# Patient Record
Sex: MALE | Race: WHITE | NOT HISPANIC OR LATINO | Employment: FULL TIME | URBAN - METROPOLITAN AREA
[De-identification: names, ages, dates, MRNs, and addresses within clinical notes are randomized per-mention and may not be internally consistent; named-entity substitution may affect disease eponyms.]

---

## 2019-04-24 ENCOUNTER — TELEPHONE (OUTPATIENT)
Dept: PULMONOLOGY | Facility: CLINIC | Age: 54
End: 2019-04-24

## 2019-04-24 NOTE — TELEPHONE ENCOUNTER
----- Message from Ivy Hernandez sent at 4/24/2019 11:02 AM CDT -----  Contact: pt  Type:  Patient Returning Call    Who Called:pt  Who Left Message for Patient nurse  Does the patient know what this is regarding?:appt  Would the patient rather a call back or a response via [facility]? Call back  Best Call Back Number:[phone]  Additional Information: na

## 2019-05-15 ENCOUNTER — OFFICE VISIT (OUTPATIENT)
Dept: SLEEP MEDICINE | Facility: CLINIC | Age: 54
End: 2019-05-15
Payer: COMMERCIAL

## 2019-05-15 VITALS
HEART RATE: 75 BPM | DIASTOLIC BLOOD PRESSURE: 84 MMHG | SYSTOLIC BLOOD PRESSURE: 126 MMHG | BODY MASS INDEX: 26.28 KG/M2 | HEIGHT: 72 IN | WEIGHT: 194 LBS | RESPIRATION RATE: 18 BRPM | OXYGEN SATURATION: 95 %

## 2019-05-15 DIAGNOSIS — G47.26 SHIFTING SLEEP-WORK SCHEDULE: ICD-10-CM

## 2019-05-15 DIAGNOSIS — I10 HYPERTENSION, UNSPECIFIED TYPE: ICD-10-CM

## 2019-05-15 DIAGNOSIS — R06.83 SNORING: ICD-10-CM

## 2019-05-15 DIAGNOSIS — G47.33 OSA (OBSTRUCTIVE SLEEP APNEA): Primary | ICD-10-CM

## 2019-05-15 PROCEDURE — 99203 OFFICE O/P NEW LOW 30 MIN: CPT | Mod: S$GLB,,, | Performed by: NURSE PRACTITIONER

## 2019-05-15 PROCEDURE — 99999 PR PBB SHADOW E&M-EST. PATIENT-LVL III: ICD-10-PCS | Mod: PBBFAC,,, | Performed by: NURSE PRACTITIONER

## 2019-05-15 PROCEDURE — 99203 PR OFFICE/OUTPT VISIT, NEW, LEVL III, 30-44 MIN: ICD-10-PCS | Mod: S$GLB,,, | Performed by: NURSE PRACTITIONER

## 2019-05-15 PROCEDURE — 99999 PR PBB SHADOW E&M-EST. PATIENT-LVL III: CPT | Mod: PBBFAC,,, | Performed by: NURSE PRACTITIONER

## 2019-05-15 NOTE — PATIENT INSTRUCTIONS
"Your provider has ordered a sleep study.   You should be receiving a phone call from the sleep lab within 2 weeks after your study has been approved by your insurance. Your insurance will decide approval for home sleep test vs. inlab sleep study (formal polysomnogram).  Please make sure you have your [facility]. If you do not hear from anyone in 2 weeks, please call the sleep lab at [phone] to schedule your sleep study.     -The formal inlab sleep studies are performed at [facility]. If it is noted that you do have sleep apnea on your initial sleep study, you may receive a call back for a second night study with the CPAP before you come back to the office.     -The home sleep test are performed at your home. You will  the home sleep study from the [facility] location. A technician will go over the home sleep study with you in detail. You can also review "[facility] home sleep study" on Youtube.     The sleep lab will also make a follow up appointment with your provider to review the results of the sleep test. This follow up appointment will be 10-14 days after your sleep study to review the results.     .lblh  What Are Snoring and Obstructive Sleep Apnea?  If youve ever had a stuffed-up nose, you know the feeling of trying to breathe through a very narrow passageway. This is what happens in your throat when you snore. While you sleep, structures in your throat partially block your air passage, making the passage narrow and hard to breathe through. If the entire passage becomes blocked and you cant breathe at all, you have sleep apnea.      Snoring Obstructive sleep apnea   Snoring  If your throat structures are too large or the muscles relax too much during sleep, the air passage may be partially blocked. As air from the nose or mouth passes around this blockage, the throat structures vibrate, causing the familiar sound of snoring. At times, this sound " can be so loud that snorers wake up others, or even themselves, during the night. Snoring gets worse as more and more of the air passage is blocked.  Obstructive sleep apnea  If the structures completely block the throat, air cant flow to the lungs at all. This is called apnea (meaning no breathing). Since the lungs arent getting fresh air, the brain tells the body to wake up just enough to tighten the muscles and unblock the air passage. With a loud gasp, breathing begins again. This process may be repeated over and over again throughout the night, making your sleep fragmented with a lighter stage of sleep. Even though you do not remember waking up many times during the night to a lighter sleep, you feel tired the next day. The lack of sleep and fresh air can also strain your lungs, heart, and other organs, leading to problems such as high blood pressure, heart attack, or stroke.  Problems in the nose and jaw  Problems in the structure of the nose may obstruct breathing. A crooked (deviated) septum or swollen turbinates can make snoring worse or lead to apnea. Also, a receding jaw may make the tongue sit too far back, so its more likely to block the airway when youre asleep.        Date Last Reviewed: 7/18/2015  © 7136-7249 [employer]. [address] [place] [address], [place] [address]. All rights reserved. This information is not intended as a substitute for professional medical care. Always follow your healthcare professional's instructions.

## 2019-05-15 NOTE — PROGRESS NOTES
Subjective:      Patient ID: Artie Davila is a 53 y.o. male.    Chief Complaint: Sleep Apnea    HPI    Patient presents to the office today for evaluation of sleep apnea.  Patient with loud snoring. Patient not having problems falling asleep, but wakes up frequently throughout the night.   [place] Sleepiness Scale score 6.  Patient has had symptoms for years. Comorbidities include HTN on 3 BP medication  Bedtime: 8:30PM  Wake time: 2:30AM    STOP - BANG Questionnaire:     1. Snoring : Do you snore loudly ?    Yes    2. Tired : Do you often feel tired, fatigued, or sleepy during daytime?   Yes    3. Observed: Has anyone observed you stop breathing during your sleep?   No    4. Blood pressure : Do you have or are you being treated for high blood pressure?   Yes    5. BMI :BMI more than 35 kg/m2?   No    6. Age : Age over 50 yr old?   Yes    7. Neck circumference: Neck circumference greater than 40 cm?   No    8. Gender: Gender male?   Yes    High risk of AKL: Yes 5 - 8  Intermediate risk of KAL: Yes 3 - 4  Low risk of KAL: Yes 0 - 2      References:   STOP Questionnaire   A Tool to Screen Patients for Obstructive Sleep Apnea: BRETT GutierrezR.C.P.C., ELYSIA Killian.B.B.S., Isabela Song M.D.,Eunice Barcenas, Ph.D., ELYSIA Montes.B.B.S.,_ ELYSIA Valenzuela.Sc.,_ Stevie Fields M.D., Elroy Yu F.R.C.P.C.; Anesthesiology 2008; 108:812-21 Copyright © 2008, the American Society of Anesthesiologists, Inc. [employer]    Patient Active Problem List   Diagnosis    Hypertensive disorder         /84   Pulse 75   Resp 18   Ht 6' (1.829 m)   Wt 88 kg (194 lb 0.1 oz)   SpO2 95%   BMI 26.31 kg/m²   Body mass index is 26.31 kg/m².    Review of Systems   Respiratory: Positive for snoring.    Psychiatric/Behavioral: Positive for sleep disturbance.   All other systems reviewed and are negative.        Objective:      Physical Exam   Constitutional: He is oriented to person,  place, and time. He appears well-developed and well-nourished.   HENT:   Head: Normocephalic and atraumatic.   Mouth/Throat: Oropharynx is clear and moist.   Mallampati Score: III   Neck: Normal range of motion. Neck supple.   Cardiovascular: Normal rate and regular rhythm.   Pulmonary/Chest: Effort normal and breath sounds normal.   Abdominal: Soft.   Musculoskeletal: He exhibits no edema.   Neurological: He is alert and oriented to person, place, and time.   Skin: Skin is warm and dry.   Psychiatric: He has a normal mood and affect.     Assessment:     1. KAL (obstructive sleep apnea)    2. Hypertension, unspecified type    3. Snoring    4. Shifting sleep-work schedule       Outpatient Encounter Medications as of 5/15/2019   Medication Sig Dispense Refill    amlodipine (NORVASC) 10 MG tablet Take 10 mg by mouth once daily.  5    hydrochlorothiazide (HYDRODIURIL) 25 MG tablet Take 25 mg by mouth every morning.  5    losartan (COZAAR) 50 MG tablet Take 50 mg by mouth once daily.  5     No facility-administered encounter medications on file as of 5/15/2019.      Orders Placed This Encounter   Procedures    Home Sleep Studies     Standing Status:   Future     Standing Expiration Date:   5/15/2020     Scheduling Instructions:      3 night protocol     Plan:     Problem List Items Addressed This Visit        Cardiac/Vascular    Hypertensive disorder      Other Visit Diagnoses     KAL (obstructive sleep apnea)    -  Primary    Relevant Orders    Home Sleep Studies    Snoring        Shifting sleep-work schedule             Home sleep test for evaluation of sleep apnea. Return to clinic when study is available for review.

## 2019-05-16 ENCOUNTER — TELEPHONE (OUTPATIENT)
Dept: PULMONOLOGY | Facility: CLINIC | Age: 54
End: 2019-05-16

## 2019-06-07 ENCOUNTER — PROCEDURE VISIT (OUTPATIENT)
Dept: SLEEP MEDICINE | Facility: CLINIC | Age: 54
End: 2019-06-07
Payer: COMMERCIAL

## 2019-06-07 DIAGNOSIS — G47.33 OSA (OBSTRUCTIVE SLEEP APNEA): Primary | ICD-10-CM

## 2019-06-07 PROCEDURE — 95806 PR SLEEP STUDY, UNATTENDED, SIMUL RECORD HR/O2 SAT/RESP FLOW/RESP EFFT: ICD-10-PCS | Mod: S$GLB,,, | Performed by: INTERNAL MEDICINE

## 2019-06-07 PROCEDURE — 99499 NO LOS: ICD-10-PCS | Mod: S$GLB,,, | Performed by: INTERNAL MEDICINE

## 2019-06-07 PROCEDURE — 99499 UNLISTED E&M SERVICE: CPT | Mod: S$GLB,,, | Performed by: INTERNAL MEDICINE

## 2019-06-07 PROCEDURE — 95806 SLEEP STUDY UNATT&RESP EFFT: CPT | Mod: S$GLB,,, | Performed by: INTERNAL MEDICINE

## 2019-06-07 NOTE — PROCEDURES
Home Sleep Studies  Date/Time: 6/7/2019 1:54 PM  Performed by: Roldan Kelly MD  Authorized by: Elizabeth Lejeune, NP       Assessment and Recommendations 2 night study MILD OBSTRUCTIVE SLEEP APNEA with overall AHI 9.9/hr ( 56 events: Night #1) Oxygen desaturation: 86%. SpO2 between 85% to 89% for   1 min. Patient snored 100% time above 50 . Heart rate range: 76 bpm -91 bpm  REC's: Initiate APAP at 6-20 cm WP using mask of choice with heated humidification. INLAB CPAP titration may also be option. Other options may be considered if intolerant to PAP: To be discussed with sleep provider. Weight loss/management. with regular exercise per direction of physician. Avoid drowsy driving. Follow up in sleep clinic to maximize adherence and ensure resolution of symptoms.

## 2019-06-07 NOTE — PROGRESS NOTES
Assessment and Recommendations 2 night study MILD OBSTRUCTIVE SLEEP APNEA with overall AHI 9.9/hr ( 56 events: Night #1) Oxygen desaturation: 86%. SpO2 between 85% to 89% for   1 min. Patient snored 100% time above 50 . Heart rate range: 76 bpm -91 bpm  REC's: Initiate APAP at 6-20 cm WP using mask of choice with heated humidification. INLAB CPAP titration may also be option. Other options may be considered if intolerant to PAP: To be discussed with sleep provider. Weight loss/management. with regular exercise per direction of physician. Avoid drowsy driving. Follow up in sleep clinic to maximize adherence and ensure resolution of symptoms.

## 2019-06-07 NOTE — Clinical Note
Assessment and Recommendations 2 night study MILD OBSTRUCTIVE SLEEP APNEA with overall AHI 9.9/hr ( 56 events: Night #1) Oxygen desaturation: 86%. SpO2 between 85% to 89% for   1 min. Patient snored 100% time above 50 . Heart rate range: 76 bpm -91 bpmREC's: Initiate APAP at 6-20 cm WP using mask of choice with heated humidification. INLAB CPAP titration may also be option. Other options may be considered if intolerant to PAP: To be discussed with sleep provider. Weight loss/management. with regular exercise per direction of physician. Avoid drowsy driving. Follow up in sleep clinic to maximize adherence and ensure resolution of symptoms.

## 2019-06-10 ENCOUNTER — TELEPHONE (OUTPATIENT)
Dept: PULMONOLOGY | Facility: CLINIC | Age: 54
End: 2019-06-10

## 2019-06-10 DIAGNOSIS — G47.33 OSA (OBSTRUCTIVE SLEEP APNEA): Primary | ICD-10-CM

## 2019-06-11 ENCOUNTER — TELEPHONE (OUTPATIENT)
Dept: PULMONOLOGY | Facility: CLINIC | Age: 54
End: 2019-06-11

## 2019-08-21 ENCOUNTER — OFFICE VISIT (OUTPATIENT)
Dept: SLEEP MEDICINE | Facility: CLINIC | Age: 54
End: 2019-08-21
Payer: COMMERCIAL

## 2019-08-21 VITALS
WEIGHT: 195.56 LBS | SYSTOLIC BLOOD PRESSURE: 124 MMHG | OXYGEN SATURATION: 98 % | DIASTOLIC BLOOD PRESSURE: 78 MMHG | BODY MASS INDEX: 26.52 KG/M2 | HEART RATE: 81 BPM | RESPIRATION RATE: 14 BRPM

## 2019-08-21 DIAGNOSIS — G47.33 OSA (OBSTRUCTIVE SLEEP APNEA): Primary | ICD-10-CM

## 2019-08-21 DIAGNOSIS — I10 HYPERTENSION, UNSPECIFIED TYPE: ICD-10-CM

## 2019-08-21 PROCEDURE — 99213 OFFICE O/P EST LOW 20 MIN: CPT | Mod: S$GLB,,, | Performed by: NURSE PRACTITIONER

## 2019-08-21 PROCEDURE — 99999 PR PBB SHADOW E&M-EST. PATIENT-LVL III: CPT | Mod: PBBFAC,,, | Performed by: NURSE PRACTITIONER

## 2019-08-21 PROCEDURE — 99213 PR OFFICE/OUTPT VISIT, EST, LEVL III, 20-29 MIN: ICD-10-PCS | Mod: S$GLB,,, | Performed by: NURSE PRACTITIONER

## 2019-08-21 PROCEDURE — 99999 PR PBB SHADOW E&M-EST. PATIENT-LVL III: ICD-10-PCS | Mod: PBBFAC,,, | Performed by: NURSE PRACTITIONER

## 2019-08-21 NOTE — PROGRESS NOTES
Subjective:      Patient ID: Artie Davila is a 53 y.o. male.    Chief Complaint: Follow-up (2 month )    HPI  Presents to office for review of AutoPAP therapy. Patient states improved symptoms with use of AutoPAP. Sleeping more soundly. Waking up feeling more refreshed. Improved daytime sleepiness. Patient states he is benefiting from use of the AutoPAP.   HTN on 3 BP meds    /78   Pulse 81   Resp 14   Wt 88.7 kg (195 lb 8.8 oz)   SpO2 98%   BMI 26.52 kg/m²   Body mass index is 26.52 kg/m².    Review of Systems   Constitutional: Negative.    HENT: Negative.    Respiratory: Negative.    Cardiovascular: Negative.    Musculoskeletal: Negative.    Gastrointestinal: Negative.    Neurological: Negative.    Psychiatric/Behavioral: Negative.      Objective:      Physical Exam   Constitutional: He is oriented to person, place, and time. He appears well-developed and well-nourished.   HENT:   Head: Normocephalic and atraumatic.   Mouth/Throat: Oropharynx is clear and moist.   Neck: Normal range of motion. Neck supple.   Cardiovascular: Normal rate and regular rhythm.   Pulmonary/Chest: Effort normal and breath sounds normal.   Abdominal: Soft.   Musculoskeletal: He exhibits no edema.   Neurological: He is alert and oriented to person, place, and time.   Skin: Skin is warm and dry.   Psychiatric: He has a normal mood and affect.     Personal Diagnostic Review  Initial Compliance Period  Mask:  Compliance Summary  7/16/2019 - 8/14/2019 (30 days)  Days with Device Usage 30 days  Days without Device Usage 0 days  Percent Days with Device Usage 100.0%  Cumulative Usage 8 days 8 hrs. 4 mins. 24 secs.  Maximum Usage (1 Day) 10 hrs. 23 mins. 37 secs.  Average Usage (All Days) 6 hrs. 40 mins. 8 secs.  Average Usage (Days Used) 6 hrs. 40 mins. 8 secs.  Minimum Usage (1 Day) 7 mins. 25 secs.  Percent of Days with Usage >= 4 Hours 96.7%  Percent of Days with Usage < 4 Hours 3.3%  Date Range  Total Blower Time 8 days 9 hrs. 29  mins. 26 secs.  Average AHI 1.1  Auto-CPAP Summary  Auto-CPAP Mean Pressure 6.5 cmH2O  Auto-CPAP Peak Average Pressure 7.5 cmH2O  Average Device Pressure <= 90% of Time 7.5 cmH2O  Average Time in Large Leak Per Day 8 secs.  Assessment:       1. KAL (obstructive sleep apnea)    2. Hypertension, unspecified type        Outpatient Encounter Medications as of 8/21/2019   Medication Sig Dispense Refill    amlodipine (NORVASC) 10 MG tablet Take 10 mg by mouth once daily.  5    hydrochlorothiazide (HYDRODIURIL) 25 MG tablet Take 25 mg by mouth every morning.  5    losartan (COZAAR) 50 MG tablet Take 50 mg by mouth once daily.  5     No facility-administered encounter medications on file as of 8/21/2019.      Orders Placed This Encounter   Procedures    CPAP/BIPAP SUPPLIES     Order Specific Question:   Type of mask:     Answer:   Nasal     Order Specific Question:   Headgear?     Answer:   Yes     Order Specific Question:   Tubing?     Answer:   Yes     Order Specific Question:   Humidifier chamber?     Answer:   Yes     Order Specific Question:   Chin strap?     Answer:   Yes     Order Specific Question:   Filters?     Answer:   Yes     Order Specific Question:   Cushions?     Answer:   Yes     Order Specific Question:   Length of need (1-99 months):     Answer:   99     Plan:        Problem List Items Addressed This Visit        Cardiac/Vascular    Hypertensive disorder       Other    KAL (obstructive sleep apnea) - Primary    Current Assessment & Plan     Doing well on PAP settings. Patient is compliant. Follow up in 12 months with PAP data download or call earlier if any problems.           Relevant Orders    CPAP/BIPAP SUPPLIES

## 2019-11-26 ENCOUNTER — TELEPHONE (OUTPATIENT)
Dept: PULMONOLOGY | Facility: CLINIC | Age: 54
End: 2019-11-26

## 2021-03-12 ENCOUNTER — TELEPHONE (OUTPATIENT)
Dept: PULMONOLOGY | Facility: CLINIC | Age: 56
End: 2021-03-12

## 2021-03-18 ENCOUNTER — TELEPHONE (OUTPATIENT)
Dept: PULMONOLOGY | Facility: CLINIC | Age: 56
End: 2021-03-18

## 2021-08-09 ENCOUNTER — TELEPHONE (OUTPATIENT)
Dept: PULMONOLOGY | Facility: CLINIC | Age: 56
End: 2021-08-09

## 2022-01-26 ENCOUNTER — TELEPHONE (OUTPATIENT)
Dept: PULMONOLOGY | Facility: CLINIC | Age: 57
End: 2022-01-26
Payer: COMMERCIAL

## 2022-01-26 NOTE — TELEPHONE ENCOUNTER
----- Message from Latasha Chase sent at 1/26/2022  3:45 PM CST -----  Pt is requesting a call back in regards to some questions that he has. Pt can be reached at [phone]

## 2022-01-26 NOTE — TELEPHONE ENCOUNTER
Phoned pt, pt registered recall 7 months ago and has not received replacement. Instructed to call rochelle robles at [phone] to check on the status of that order.     Follow up apt needed. Pt will check to see if [facility] is in network with insurance and give us a call back.      ----- Message from Claire Lima sent at 1/26/2022  1:56 PM CST -----  Contact: ANTIONE DAVIS [[id number]]  .Type:  Needs Medical Advice    Who Called: ANTIONE DAVIS [[id number]]  Would the patient rather a call back or a response via [facility]? Call  Best Call Back Number: .[phone]    Additional Information: Pt is req a call back in regards to a recall on his sleep apnea machine.

## 2022-02-01 ENCOUNTER — TELEPHONE (OUTPATIENT)
Dept: PULMONOLOGY | Facility: CLINIC | Age: 57
End: 2022-02-01
Payer: COMMERCIAL

## 2022-02-01 NOTE — TELEPHONE ENCOUNTER
Instructed pt that he does not need to bring pap----- Message from Judi Chung sent at 2/1/2022  3:20 PM CST -----  Contact: self  Pty would like a callback regarding upcoming appt on tomorrow, would like to know if he has to bring his machine. Please give him a callback at [phone]. Thanks

## 2022-02-02 ENCOUNTER — OFFICE VISIT (OUTPATIENT)
Dept: SLEEP MEDICINE | Facility: CLINIC | Age: 57
End: 2022-02-02
Payer: COMMERCIAL

## 2022-02-02 VITALS
WEIGHT: 189.13 LBS | DIASTOLIC BLOOD PRESSURE: 78 MMHG | SYSTOLIC BLOOD PRESSURE: 126 MMHG | HEART RATE: 110 BPM | RESPIRATION RATE: 18 BRPM | HEIGHT: 72 IN | OXYGEN SATURATION: 98 % | BODY MASS INDEX: 25.62 KG/M2

## 2022-02-02 DIAGNOSIS — I10 HYPERTENSION, UNSPECIFIED TYPE: ICD-10-CM

## 2022-02-02 DIAGNOSIS — G47.33 OSA (OBSTRUCTIVE SLEEP APNEA): Primary | ICD-10-CM

## 2022-02-02 PROCEDURE — 99999 PR PBB SHADOW E&M-EST. PATIENT-LVL III: CPT | Mod: PBBFAC,,, | Performed by: NURSE PRACTITIONER

## 2022-02-02 PROCEDURE — 99999 PR PBB SHADOW E&M-EST. PATIENT-LVL III: ICD-10-PCS | Mod: PBBFAC,,, | Performed by: NURSE PRACTITIONER

## 2022-02-02 PROCEDURE — 99213 OFFICE O/P EST LOW 20 MIN: CPT | Mod: S$GLB,,, | Performed by: NURSE PRACTITIONER

## 2022-02-02 PROCEDURE — 99213 PR OFFICE/OUTPT VISIT, EST, LEVL III, 20-29 MIN: ICD-10-PCS | Mod: S$GLB,,, | Performed by: NURSE PRACTITIONER

## 2022-02-02 NOTE — PROGRESS NOTES
Subjective:      Patient ID: Arite Davila is a 56 y.o. male.    Chief Complaint: Sleep Apnea    HPI  Presents for KAL on autopap. He is reluctant to wear PAP due to recall but sleep much better when he does. Benefits from u se Discussed FDA recommendations.   No fever, chills, or hemoptysis. No pleuritic type chest pain. Breathing is stable as compared to 6 months ago.      Patient Active Problem List   Diagnosis    Hypertensive disorder    KAL (obstructive sleep apnea)       /78   Pulse 110   Resp 18   Ht 6' (1.829 m)   Wt 85.8 kg (189 lb 2.5 oz)   SpO2 98%   BMI 25.65 kg/m²   Body mass index is 25.65 kg/m².    Review of Systems   Constitutional: Negative.    HENT: Negative.    Respiratory: Negative.    Cardiovascular: Negative.    Musculoskeletal: Negative.    Gastrointestinal: Negative.    Neurological: Negative.    Psychiatric/Behavioral: Negative.      Objective:      Physical Exam  Constitutional:       Appearance: Normal appearance. He is well-developed.   HENT:      Head: Normocephalic and atraumatic.      Mouth/Throat:      Comments: Wearing mask due to COVID-19 protocol  Neck:      Thyroid: No thyroid mass or thyromegaly.      Trachea: Trachea normal.   Cardiovascular:      Rate and Rhythm: Normal rate and regular rhythm.      Heart sounds: Normal heart sounds.   Pulmonary:      Effort: Pulmonary effort is normal.      Breath sounds: Normal breath sounds. No wheezing, rhonchi or rales.   Chest:      Chest wall: There is no dullness to percussion.   Abdominal:      Palpations: Abdomen is soft. There is no splenomegaly or mass.      Tenderness: There is no abdominal tenderness.   Musculoskeletal:         General: Normal range of motion.      Cervical back: Normal range of motion and neck supple.   Skin:     General: Skin is warm and dry.   Neurological:      Mental Status: He is alert and oriented to person, place, and time.   Psychiatric:         Mood and Affect: Mood normal.         Behavior:  Behavior normal.       Personal Diagnostic Review  APAP download. Normal AHI.     Assessment:       1. KAL (obstructive sleep apnea)    2. Hypertension, unspecified type        Outpatient Encounter Medications as of 2/2/2022   Medication Sig Dispense Refill    amlodipine (NORVASC) 10 MG tablet Take 10 mg by mouth once daily.  5    hydrochlorothiazide (HYDRODIURIL) 25 MG tablet Take 25 mg by mouth every morning.  5    losartan (COZAAR) 50 MG tablet Take 50 mg by mouth once daily.  5     No facility-administered encounter medications on file as of 2/2/2022.     Orders Placed This Encounter   Procedures    CPAP/BIPAP SUPPLIES     [facility] Fax: [phone]     Order Specific Question:   Length of need (1-99 months):     Answer:   99     Order Specific Question:   Choose ONE mask type and its corresponding cushions and/or pillows:     Answer:    Nasal Pillow Mask, 1 per 90 days:  Nasal Pillows, (6 per 90 days)     Order Specific Question:   Choose EITHER Heated or Non-Heated Tubjing     Answer:    Non-Heated Tubing, 1 per 90 days     Order Specific Question:   All other supplies as needed as listed below:     Answer:    Headgear, 1 per 180 days     Order Specific Question:   All other supplies as needed as listed below:     Answer:    Disposable Filter, 6 per 90 days     Order Specific Question:   All other supplies as needed as listed below:     Answer:    Non-Disposable Filter, 1 per 180 days     Order Specific Question:   All other supplies as needed as listed below:     Answer:    Humidifier Chamber, 1 per 180 days     Plan:      benefits from PAP use. Supply order   discussed FDA recommendations.  Problem List Items Addressed This Visit        Cardiac/Vascular    Hypertensive disorder       Other    KAL (obstructive sleep apnea) - Primary    Relevant Orders    CPAP/BIPAP SUPPLIES

## 2022-03-01 ENCOUNTER — TELEPHONE (OUTPATIENT)
Dept: SLEEP MEDICINE | Facility: CLINIC | Age: 57
End: 2022-03-01
Payer: COMMERCIAL

## 2022-03-01 NOTE — TELEPHONE ENCOUNTER
----- Message from Alexia Weinstein sent at 3/1/2022  2:44 PM CST -----  Patient would like a call back at [phone] in regard to his CPAP machine supplies. He states that he has not heard from the company about his  supplies.    Thanks

## 2022-03-02 ENCOUNTER — TELEPHONE (OUTPATIENT)
Dept: SLEEP MEDICINE | Facility: CLINIC | Age: 57
End: 2022-03-02
Payer: COMMERCIAL

## 2022-03-02 NOTE — TELEPHONE ENCOUNTER
----- Message from Jessica Barone sent at 3/2/2022 10:00 AM CST -----  Contact: self/[phone]  Pt called in regards to talking to the office about his sleep supplies. Pt would like a call back.    Please advise

## 2025-04-16 DIAGNOSIS — Z76.89 ENCOUNTER TO ESTABLISH CARE: Primary | ICD-10-CM

## 2025-04-16 DIAGNOSIS — Z00.00 ROUTINE HEALTH MAINTENANCE: ICD-10-CM

## 2025-04-28 ENCOUNTER — TELEPHONE (OUTPATIENT)
Dept: CARDIOLOGY | Facility: CLINIC | Age: 60
End: 2025-04-28
Payer: COMMERCIAL

## 2025-04-28 NOTE — TELEPHONE ENCOUNTER
Pt wife requested a sooner appt. Scheduled new pt appt for 5/2/2025.       ----- Message from Gilma sent at 4/28/2025  8:17 AM CDT -----  Contact: ANTIONE DAVIS [[id number]]  .Type:  Patient Requesting CallWho Called:Howard wifeDoes the patient know what this is regarding?:pt is calling to reschedule 5/7 apptWould the patient rather a call back or a response via [facility]? callBe Call Back Number:[phone] Information:

## 2025-05-02 ENCOUNTER — HOSPITAL ENCOUNTER (OUTPATIENT)
Dept: CARDIOLOGY | Facility: HOSPITAL | Age: 60
Discharge: HOME | End: 2025-05-02
Attending: INTERNAL MEDICINE
Payer: COMMERCIAL

## 2025-05-02 ENCOUNTER — OFFICE VISIT (OUTPATIENT)
Dept: CARDIOLOGY | Facility: CLINIC | Age: 60
End: 2025-05-02
Payer: COMMERCIAL

## 2025-05-02 VITALS
OXYGEN SATURATION: 97 % | DIASTOLIC BLOOD PRESSURE: 90 MMHG | HEART RATE: 79 BPM | BODY MASS INDEX: 24.91 KG/M2 | WEIGHT: 183.63 LBS | SYSTOLIC BLOOD PRESSURE: 158 MMHG

## 2025-05-02 DIAGNOSIS — Z00.00 ROUTINE HEALTH MAINTENANCE: ICD-10-CM

## 2025-05-02 DIAGNOSIS — Z76.89 ENCOUNTER TO ESTABLISH CARE: ICD-10-CM

## 2025-05-02 DIAGNOSIS — M79.605 PAIN IN BOTH LOWER EXTREMITIES: Primary | ICD-10-CM

## 2025-05-02 DIAGNOSIS — G47.33 OSA (OBSTRUCTIVE SLEEP APNEA): ICD-10-CM

## 2025-05-02 DIAGNOSIS — M79.604 PAIN IN BOTH LOWER EXTREMITIES: Primary | ICD-10-CM

## 2025-05-02 DIAGNOSIS — Z13.6 ENCOUNTER FOR SCREENING FOR CARDIOVASCULAR DISORDERS: ICD-10-CM

## 2025-05-02 DIAGNOSIS — Z82.49 FAMILY HISTORY OF CARDIOVASCULAR DISEASE: ICD-10-CM

## 2025-05-02 DIAGNOSIS — I10 ESSENTIAL HYPERTENSION: ICD-10-CM

## 2025-05-02 PROCEDURE — 99999 PR PBB SHADOW E&M-EST. PATIENT-LVL III: CPT | Mod: PBBFAC,,, | Performed by: INTERNAL MEDICINE

## 2025-05-02 RX ORDER — DICLOFENAC SODIUM 75 MG/1
75 TABLET, DELAYED RELEASE ORAL 2 TIMES DAILY
COMMUNITY

## 2025-05-02 NOTE — PROGRESS NOTES
Subjective   Patient ID:  Artie Davila is a 59 y.o. male who presents for evaluation of Numbness (PT stated his thigh are numb and that top of feet turned blue)      HPI  Pt presents for eval.  Family sees me.  He has HTN, KAL.  Nonsmoker.  Does not use CPAP.  C/o B LE pain continuous last few months, soreness thighs to knees and some discoloration of veins in feet.  No typical claudication sxs.  Denies CP.  No CHF sxs.  BP elevated today.  Does not check BP at home.  Ecg today 5/2/25 personally reviewed: NSR, normal ECG.        Past Medical History:   Diagnosis Date    Hypertension        Current Outpatient Medications   Medication Instructions    amLODIPine (NORVASC) 10 mg, Daily    diclofenac (VOLTAREN) 75 mg, Oral, 2 times daily    hydroCHLOROthiazide (HYDRODIURIL) 25 mg, Every morning    losartan (COZAAR) 50 mg, Daily         Review of Systems   Constitutional: Negative.   HENT: Negative.     Eyes: Negative.    Cardiovascular: Negative.    Respiratory: Negative.     Endocrine: Negative.    Hematologic/Lymphatic: Negative.    Skin:  Positive for color change.   Musculoskeletal:  Positive for arthritis, joint pain, myalgias and stiffness.   Gastrointestinal: Negative.    Genitourinary: Negative.    Neurological: Negative.    Psychiatric/Behavioral: Negative.     Allergic/Immunologic: Negative.        BP (!) 158/90 (BP Location: Left arm, Patient Position: Sitting)   Pulse 79   Wt 83.3 kg (183 lb 10.3 oz)   SpO2 97%   BMI 24.91 kg/m²     Wt Readings from Last 3 Encounters:   05/02/25 83.3 kg (183 lb 10.3 oz)   02/02/22 85.8 kg (189 lb 2.5 oz)   08/21/19 88.7 kg (195 lb 8.8 oz)     Temp Readings from Last 3 Encounters:   04/13/16 98.4 °F (36.9 °C) (Oral)     BP Readings from Last 3 Encounters:   05/02/25 (!) 158/90   02/02/22 126/78   08/21/19 124/78     Pulse Readings from Last 3 Encounters:   05/02/25 79   02/02/22 110   08/21/19 81          Objective     Physical Exam  Vitals and nursing note reviewed.  "  Constitutional:       Appearance: He is well-developed.   HENT:      Head: Normocephalic.   Neck:      Thyroid: No thyromegaly.      Vascular: Normal carotid pulses. No carotid bruit, hepatojugular reflux or JVD.   Cardiovascular:      Rate and Rhythm: Normal rate and regular rhythm.      Pulses:           Radial pulses are 2+ on the right side and 2+ on the left side.        Femoral pulses are 2+ on the right side and 2+ on the left side.       Dorsalis pedis pulses are 2+ on the right side and 2+ on the left side.        Posterior tibial pulses are 2+ on the right side and 2+ on the left side.      Heart sounds: S1 normal and S2 normal. Heart sounds not distant. No midsystolic click and no opening snap. No murmur heard.     No friction rub. No S3 or S4 sounds.   Pulmonary:      Effort: Pulmonary effort is normal.      Breath sounds: Normal breath sounds. No wheezing or rales.   Abdominal:      General: Bowel sounds are normal. There is no distension or abdominal bruit.      Palpations: Abdomen is soft.      Tenderness: There is no abdominal tenderness.   Musculoskeletal:      Cervical back: Normal range of motion and neck supple.   Skin:     General: Skin is warm.   Neurological:      Mental Status: He is alert and oriented to person, place, and time.   Psychiatric:         Behavior: Behavior normal.       I have reviewed all pertinent labs and cardiac studies.        Chemistry    No results found for: "NA", "K", "CL", "CO2", "BUN", "CREATININE", "GLU" No results found for: "CALCIUM", "ALKPHOS", "AST", "ALT", "BILITOT", "ESTGFRAFRICA", "EGFRNONAA"     No results found for: "WBC", "HGB", "HCT", "MCV", "PLT"           Assessment and Plan     1. Pain in both lower extremities    2. Essential hypertension    3. KAL (obstructive sleep apnea)    4. Family history of cardiovascular disease    5. Encounter for screening for cardiovascular disorders        Plan:      Sxs not typical for PAD/CV disease overall; may be " rheumatological condition.  B LE arterial/venous u/s.  Exercise KRISTEL test.  Echocardiogram.  Coronary calcium score.  Labs: CK, CBC, CMP, TSH, FLP.  Rheumatology consult advised.  CPAP advised.  Cardiac diet.    PHONE REVIEW.

## 2025-05-05 ENCOUNTER — PATIENT MESSAGE (OUTPATIENT)
Dept: RHEUMATOLOGY | Facility: CLINIC | Age: 60
End: 2025-05-05
Payer: COMMERCIAL

## 2025-05-07 ENCOUNTER — TELEPHONE (OUTPATIENT)
Dept: CARDIOLOGY | Facility: CLINIC | Age: 60
End: 2025-05-07
Payer: COMMERCIAL

## 2025-05-07 NOTE — TELEPHONE ENCOUNTER
----- Message from Vesta sent at 5/7/2025 10:18 AM CDT -----  Contact: [phone]  Type:  Patient Requesting ReferralWho Called:Artie Does the patient already have the specialty appointment scheduled?:n/aIf yes, what is the date of that appointment?:n/aReferral to What Specialty:RheumatologyReason for Referral: Pain in both lower extremitiesDoes the patient want the referral with a specific physician?:yes Is the specialist an [facility] Patient Requesting a Response?:yes Would the patient rather a call back or a response via [facility]? Call Back Best Call Back Number:[phone] Additional Information: pt is requesting a referral be sent over to [facility].Thanks KB

## 2025-05-10 PROCEDURE — 99285 EMERGENCY DEPT VISIT HI MDM: CPT | Mod: 25

## 2025-05-11 ENCOUNTER — HOSPITAL ENCOUNTER (EMERGENCY)
Facility: HOSPITAL | Age: 60
Discharge: HOME | End: 2025-05-11
Attending: EMERGENCY MEDICINE
Payer: COMMERCIAL

## 2025-05-11 VITALS
WEIGHT: 184.19 LBS | RESPIRATION RATE: 12 BRPM | HEART RATE: 76 BPM | OXYGEN SATURATION: 97 % | SYSTOLIC BLOOD PRESSURE: 138 MMHG | HEIGHT: 72 IN | BODY MASS INDEX: 24.95 KG/M2 | TEMPERATURE: 98 F | DIASTOLIC BLOOD PRESSURE: 82 MMHG

## 2025-05-11 DIAGNOSIS — R10.30 GROIN PAIN: ICD-10-CM

## 2025-05-11 LAB
ABSOLUTE EOSINOPHIL (OHS): 0.17 K/UL
ABSOLUTE MONOCYTE (OHS): 0.54 K/UL (ref 0.3–1)
ABSOLUTE NEUTROPHIL COUNT (OHS): 2.57 K/UL (ref 1.8–7.7)
ALBUMIN SERPL BCP-MCNC: 4 G/DL (ref 3.5–5.2)
ALP SERPL-CCNC: 52 UNIT/L (ref 40–150)
ALT SERPL W/O P-5'-P-CCNC: 21 UNIT/L (ref 10–44)
ANION GAP (OHS): 12 MMOL/L (ref 8–16)
AST SERPL-CCNC: 22 UNIT/L (ref 11–45)
BASOPHILS # BLD AUTO: 0.08 K/UL
BASOPHILS NFR BLD AUTO: 1.3 %
BILIRUB SERPL-MCNC: 0.2 MG/DL (ref 0.1–1)
BILIRUB UR QL STRIP.AUTO: NEGATIVE
BUN SERPL-MCNC: 9 MG/DL (ref 6–20)
CALCIUM SERPL-MCNC: 8.7 MG/DL (ref 8.7–10.5)
CHLORIDE SERPL-SCNC: 107 MMOL/L (ref 95–110)
CLARITY UR: CLEAR
CO2 SERPL-SCNC: 18 MMOL/L (ref 23–29)
COLOR UR AUTO: YELLOW
CREAT SERPL-MCNC: 0.8 MG/DL (ref 0.5–1.4)
CRP SERPL-MCNC: 0.5 MG/L
ERYTHROCYTE [DISTWIDTH] IN BLOOD BY AUTOMATED COUNT: 13.3 % (ref 11.5–14.5)
ERYTHROCYTE [SEDIMENTATION RATE] IN BLOOD: 4 MM/HR
GFR SERPLBLD CREATININE-BSD FMLA CKD-EPI: >60 ML/MIN/1.73/M2
GLUCOSE SERPL-MCNC: 89 MG/DL (ref 70–110)
GLUCOSE UR QL STRIP: NEGATIVE
HCT VFR BLD AUTO: 42.3 % (ref 40–54)
HCV AB SERPL QL IA: NEGATIVE
HGB BLD-MCNC: 14.5 GM/DL (ref 14–18)
HGB UR QL STRIP: NEGATIVE
HIV 1+2 AB+HIV1 P24 AG SERPL QL IA: NEGATIVE
HOLD SPECIMEN: NORMAL
IMM GRANULOCYTES # BLD AUTO: 0.01 K/UL (ref 0–0.04)
IMM GRANULOCYTES NFR BLD AUTO: 0.2 % (ref 0–0.5)
KETONES UR QL STRIP: NEGATIVE
LEUKOCYTE ESTERASE UR QL STRIP: NEGATIVE
LYMPHOCYTES # BLD AUTO: 2.68 K/UL (ref 1–4.8)
MCH RBC QN AUTO: 31.8 PG (ref 27–31)
MCHC RBC AUTO-ENTMCNC: 34.3 G/DL (ref 32–36)
MCV RBC AUTO: 93 FL (ref 82–98)
NITRITE UR QL STRIP: NEGATIVE
NUCLEATED RBC (/100WBC) (OHS): 0 /100 WBC
PH UR STRIP: 6 [PH]
PLATELET # BLD AUTO: 252 K/UL (ref 150–450)
PMV BLD AUTO: 9.4 FL (ref 9.2–12.9)
POTASSIUM SERPL-SCNC: 4.2 MMOL/L (ref 3.5–5.1)
PROT SERPL-MCNC: 7.6 GM/DL (ref 6–8.4)
PROT UR QL STRIP: NEGATIVE
RBC # BLD AUTO: 4.56 M/UL (ref 4.6–6.2)
RELATIVE EOSINOPHIL (OHS): 2.8 %
RELATIVE LYMPHOCYTE (OHS): 44.3 % (ref 18–48)
RELATIVE MONOCYTE (OHS): 8.9 % (ref 4–15)
RELATIVE NEUTROPHIL (OHS): 42.5 % (ref 38–73)
SODIUM SERPL-SCNC: 137 MMOL/L (ref 136–145)
SP GR UR STRIP: 1.01
UROBILINOGEN UR STRIP-ACNC: NEGATIVE EU/DL
WBC # BLD AUTO: 6.05 K/UL (ref 3.9–12.7)

## 2025-05-11 PROCEDURE — 86803 HEPATITIS C AB TEST: CPT | Performed by: EMERGENCY MEDICINE

## 2025-05-11 PROCEDURE — 80053 COMPREHEN METABOLIC PANEL: CPT | Performed by: NURSE PRACTITIONER

## 2025-05-11 PROCEDURE — 25500020 PHARM REV CODE 255: Performed by: EMERGENCY MEDICINE

## 2025-05-11 PROCEDURE — 25000003 PHARM REV CODE 250: Performed by: EMERGENCY MEDICINE

## 2025-05-11 PROCEDURE — 96374 THER/PROPH/DIAG INJ IV PUSH: CPT

## 2025-05-11 PROCEDURE — 87389 HIV-1 AG W/HIV-1&-2 AB AG IA: CPT | Performed by: EMERGENCY MEDICINE

## 2025-05-11 PROCEDURE — 63600175 PHARM REV CODE 636 W HCPCS: Mod: JZ,TB | Performed by: EMERGENCY MEDICINE

## 2025-05-11 PROCEDURE — 81003 URINALYSIS AUTO W/O SCOPE: CPT | Performed by: NURSE PRACTITIONER

## 2025-05-11 PROCEDURE — 85025 COMPLETE CBC W/AUTO DIFF WBC: CPT | Performed by: NURSE PRACTITIONER

## 2025-05-11 PROCEDURE — 85652 RBC SED RATE AUTOMATED: CPT | Performed by: EMERGENCY MEDICINE

## 2025-05-11 PROCEDURE — 86140 C-REACTIVE PROTEIN: CPT | Performed by: EMERGENCY MEDICINE

## 2025-05-11 RX ORDER — GABAPENTIN 100 MG/1
100 CAPSULE ORAL
Status: COMPLETED | OUTPATIENT
Start: 2025-05-11 | End: 2025-05-11

## 2025-05-11 RX ORDER — KETOROLAC TROMETHAMINE 30 MG/ML
15 INJECTION, SOLUTION INTRAMUSCULAR; INTRAVENOUS
Status: COMPLETED | OUTPATIENT
Start: 2025-05-11 | End: 2025-05-11

## 2025-05-11 RX ORDER — GABAPENTIN 100 MG/1
100-200 CAPSULE ORAL 3 TIMES DAILY
Qty: 30 CAPSULE | Refills: 0 | Status: SHIPPED | OUTPATIENT
Start: 2025-05-11 | End: 2025-05-13

## 2025-05-11 RX ADMIN — IOHEXOL 100 ML: 350 INJECTION, SOLUTION INTRAVENOUS at 02:05

## 2025-05-11 RX ADMIN — KETOROLAC TROMETHAMINE 15 MG: 30 INJECTION, SOLUTION INTRAMUSCULAR at 02:05

## 2025-05-11 RX ADMIN — GABAPENTIN 100 MG: 100 CAPSULE ORAL at 01:05

## 2025-05-11 NOTE — ED PROVIDER NOTES
SCRIBE #1 NOTE: IMya, am scribing for, and in the presence of, Elier Iniguez MD. I have scribed the entire note.       History     Chief Complaint   Patient presents with    Groin Pain     Pt c/o bea groin pain radiating down both legs x 2 months. Pt denies injury, swelling or urinary symptoms.     Review of patient's allergies indicates:   Allergen Reactions    Ace inhibitors          History of Present Illness     HPI    5/11/2025, 12:55 AM  History obtained from the medical records, family, and patient      History of Present Illness: Artie Davila is a 59 y.o. male patient with a PMHx of HTN who presents to the Emergency Department for evaluation of groin pain that radiates down the front of his legs to the knees, bilaterally, which onset approximately 2 months ago. Pt was seen by Dr. Coles (cardiologist) on 05/02 and was referred to rheumatology. Rheumatology appointment is in July. Pt describes the pain as a sharp and burning sensation. Pt constantly moves for work and is unable to tolerate sxs any longer. Symptoms are constant and moderate in severity. No mitigating or exacerbating factors reported. Bending knees and hips do not worsen sxs. No additional associated sxs provided. Patient denies any fever, nausea, dysuria, difficulty urinating, hematuria, and all other sxs at this time. Prior Tx includes tylenol and ibuprofen with minimal relief. No further complaints or concerns at this time.       Arrival mode: Personal vehicle      PCP: Ward Vasquez MD        Past Medical History:  Past Medical History:   Diagnosis Date    Hypertension        Past Surgical History:  History reviewed. No pertinent surgical history.      Family History:  No family history on file.    Social History:  Social History     Tobacco Use    Smoking status: Never    Smokeless tobacco: Current     Types: Snuff   Substance and Sexual Activity    Alcohol use: Not on file    Drug use: Never    Sexual activity: Not  Currently        Review of Systems     Review of Systems   Constitutional:  Negative for fever.   HENT:  Negative for sore throat.    Respiratory:  Negative for shortness of breath.    Cardiovascular:  Negative for chest pain.   Gastrointestinal:  Negative for nausea.   Genitourinary:  Negative for difficulty urinating, dysuria and hematuria.        (+) Groin pain   Musculoskeletal:  Positive for arthralgias (Knees, bilaterally) and myalgias (Thighs, bilaterally). Negative for back pain.   Skin:  Negative for rash.   Neurological:  Negative for weakness.   Hematological:  Does not bruise/bleed easily.   All other systems reviewed and are negative.     Physical Exam     Initial Vitals [05/10/25 2151]   BP Pulse Resp Temp SpO2   129/77 86 19 97.8 °F (36.6 °C) 97 %      MAP       --          Physical Exam  Nursing Notes and Vital Signs Reviewed.  Constitutional: Patient is in no acute distress. Well-developed and well-nourished.  Head: Atraumatic. Normocephalic.  Eyes: PERRL. EOM intact. Conjunctivae are not pale. No scleral icterus.  ENT: Mucous membranes are moist. Oropharynx is clear and symmetric.    Neck: Supple. Full ROM. No lymphadenopathy.  Cardiovascular: Regular rate. Regular rhythm. No murmurs, rubs, or gallops. Distal pulses are 2+ and symmetric.  Pulmonary/Chest: No respiratory distress. Clear to auscultation bilaterally. No wheezing or rales.  Abdominal: Soft and non-distended.  There is no tenderness.  No rebound, guarding, or rigidity. Good bowel sounds.  Genitourinary: No CVA tenderness. No lesions. Bilateral inguinal tenderness. No hernia.   Musculoskeletal: Moves all extremities. No obvious deformities. No edema. No calf tenderness. Full ROM. Sensation intact.  Skin: Warm and dry.  Neurological:  Alert, awake, and appropriate.  Normal speech.  No acute focal neurological deficits are appreciated.  Psychiatric: Normal affect. Good eye contact. Appropriate in content.     ED Course   Procedures  ED  Vital Signs:  Vitals:    05/10/25 2149 05/10/25 2151 05/11/25 0202   BP:  129/77 (!) 147/87   Pulse:  86 82   Resp:  19    Temp:  97.8 °F (36.6 °C)    TempSrc:  Oral    SpO2:  97% 97%   Weight: 83.6 kg (184 lb 3.2 oz)     Height: 6' (1.829 m)         Abnormal Lab Results:  Labs Reviewed   COMPREHENSIVE METABOLIC PANEL - Abnormal       Result Value    Sodium 137      Potassium 4.2      Chloride 107      CO2 18 (*)     Glucose 89      BUN 9      Creatinine 0.8      Calcium 8.7      Protein Total 7.6      Albumin 4.0      Bilirubin Total 0.2      ALP 52      AST 22      ALT 21      Anion Gap 12      eGFR >60     CBC WITH DIFFERENTIAL - Abnormal    WBC 6.05      RBC 4.56 (*)     HGB 14.5      HCT 42.3      MCV 93      MCH 31.8 (*)     MCHC 34.3      RDW 13.3      Platelet Count 252      MPV 9.4      Nucleated RBC 0      Neut % 42.5      Lymph % 44.3      Mono % 8.9      Eos % 2.8      Basophil % 1.3      Imm Grans % 0.2      Neut # 2.57      Lymph # 2.68      Mono # 0.54      Eos # 0.17      Baso # 0.08      Imm Grans # 0.01     HEPATITIS C ANTIBODY - Normal    Hep C Ab Interp Negative     HIV 1 / 2 ANTIBODY - Normal    HIV 1/2 Ag/Ab Negative     URINALYSIS, REFLEX TO URINE CULTURE - Normal    Color, UA Yellow      Appearance, UA Clear      pH, UA 6.0      Spec Grav UA 1.015      Protein, UA Negative      Glucose, UA Negative      Ketones, UA Negative      Bilirubin, UA Negative      Blood, UA Negative      Nitrites, UA Negative      Urobilinogen, UA Negative      Leukocyte Esterase, UA Negative     SEDIMENTATION RATE - Normal    Sed Rate 4     C-REACTIVE PROTEIN - Normal    CRP 0.5     CBC W/ AUTO DIFFERENTIAL    Narrative:     The following orders were created for panel order CBC auto differential.  Procedure                               Abnormality         Status                     ---------                               -----------         ------                     CBC with Differential[[id number]]       Abnormal             Final result                 Please view results for these tests on the individual orders.   GREY TOP URINE HOLD    Extra Tube Hold for add-ons.     HEP C VIRUS HOLD SPECIMEN        All Lab Results:  Results for orders placed or performed during the hospital encounter of 05/11/25   Urinalysis, Reflex to Urine Culture Urine, Clean Catch    Collection Time: 05/11/25 12:26 AM    Specimen: Urine, Clean Catch   Result Value Ref Range    Color, UA Yellow Straw, Isha, Yellow, Light-Orange    Appearance, UA Clear Clear    pH, UA 6.0 5.0 - 8.0    Spec Grav UA 1.015 1.005 - 1.030    Protein, UA Negative Negative    Glucose, UA Negative Negative    Ketones, UA Negative Negative    Bilirubin, UA Negative Negative    Blood, UA Negative Negative    Nitrites, UA Negative Negative    Urobilinogen, UA Negative <2.0 EU/dL    Leukocyte Esterase, UA Negative Negative   GREY TOP URINE HOLD    Collection Time: 05/11/25 12:26 AM   Result Value Ref Range    Extra Tube Hold for add-ons.    Hepatitis C Antibody    Collection Time: 05/11/25  1:04 AM   Result Value Ref Range    Hep C Ab Interp Negative Negative   HIV 1/2 Ag/Ab (4th Gen)    Collection Time: 05/11/25  1:04 AM   Result Value Ref Range    HIV 1/2 Ag/Ab Negative Negative   Comprehensive metabolic panel    Collection Time: 05/11/25  1:04 AM   Result Value Ref Range    Sodium 137 136 - 145 mmol/L    Potassium 4.2 3.5 - 5.1 mmol/L    Chloride 107 95 - 110 mmol/L    CO2 18 (L) 23 - 29 mmol/L    Glucose 89 70 - 110 mg/dL    BUN 9 6 - 20 mg/dL    Creatinine 0.8 0.5 - 1.4 mg/dL    Calcium 8.7 8.7 - 10.5 mg/dL    Protein Total 7.6 6.0 - 8.4 gm/dL    Albumin 4.0 3.5 - 5.2 g/dL    Bilirubin Total 0.2 0.1 - 1.0 mg/dL    ALP 52 40 - 150 unit/L    AST 22 11 - 45 unit/L    ALT 21 10 - 44 unit/L    Anion Gap 12 8 - 16 mmol/L    eGFR >60 >60 mL/min/1.73/m2   CBC with Differential    Collection Time: 05/11/25  1:04 AM   Result Value Ref Range    WBC 6.05 3.90 - 12.70 K/uL    RBC 4.56  (L) 4.60 - 6.20 M/uL    HGB 14.5 14.0 - 18.0 gm/dL    HCT 42.3 40.0 - 54.0 %    MCV 93 82 - 98 fL    MCH 31.8 (H) 27.0 - 31.0 pg    MCHC 34.3 32.0 - 36.0 g/dL    RDW 13.3 11.5 - 14.5 %    Platelet Count 252 150 - 450 K/uL    MPV 9.4 9.2 - 12.9 fL    Nucleated RBC 0 <=0 /100 WBC    Neut % 42.5 38 - 73 %    Lymph % 44.3 18 - 48 %    Mono % 8.9 4 - 15 %    Eos % 2.8 <=8 %    Basophil % 1.3 <=1.9 %    Imm Grans % 0.2 0.0 - 0.5 %    Neut # 2.57 1.8 - 7.7 K/uL    Lymph # 2.68 1 - 4.8 K/uL    Mono # 0.54 0.3 - 1 K/uL    Eos # 0.17 <=0.5 K/uL    Baso # 0.08 <=0.2 K/uL    Imm Grans # 0.01 0.00 - 0.04 K/uL   Sedimentation rate    Collection Time: 05/11/25  1:05 AM   Result Value Ref Range    Sed Rate 4 <=23 mm/hr   C-reactive protein    Collection Time: 05/11/25  1:05 AM   Result Value Ref Range    CRP 0.5 <=8.2 mg/L       Imaging Results:  Imaging Results              CT Pelvis With IV Contrast NO Oral Contrast (In process)    Procedure changed from CT Abdomen Pelvis With IV Contrast NO Oral Contrast                    X-Ray Hips Bilateral 2 View Incl AP Pelvis (Preliminary result)  Result time 05/11/25 02:01:55      Wet Read by Elier Iniguez MD (05/11/25 02:01:55, [facility] - Emergency Dept., Emergency Medicine)    No fracture or dislocation                                  Type of Interpretation: Outside Written Report  STAT Radiology Procedure Done:  CT pelvis with intravenous contrast  Interpretation:    No evidence of inguinal mass or hernia  Mild prostatomegaly.  Radiologist:  Riley Morales MD  05/11/2025  02:42    No EKG was ordered.           The Emergency Provider reviewed the vital signs and test results, which are outlined above.     ED Discussion     3:39 AM: Reassessed pt at this time. Discussed with patient and/or family/caretaker all pertinent ED information and results. Discussed pt dx and plan of tx. Gave the patient all f/u and return to the ED instructions. All questions and concerns were addressed at this  time. Patient and/or family/caretaker expresses understanding of information and instructions, and is comfortable with plan to discharge. Pt is stable for discharge.     I discussed with patient and/or family/caretaker that evaluation in the ED does not suggest any emergent or life threatening medical conditions requiring immediate intervention beyond what was provided in the ED, and I believe patient is safe for discharge.  Regardless, an unremarkable evaluation in the ED does not preclude the development or presence of a serious of life threatening condition. As such, I instructed that the patient is to return immediately for any worsening or change in current symptoms.    ED Course as of 05/11/25 0529   Sun May 11, 2025   7044 DDx includes hernia, hip flexor strain, neuropathy, CA, UTI, electrolyte abnormality, inguinal adnopathy, DVT. [BA]      ED Course User Index  [BA] Elier Iniguez MD     Medical Decision Making  Amount and/or Complexity of Data Reviewed  Labs: ordered. Decision-making details documented in ED Course.  Radiology: ordered and independent interpretation performed. Decision-making details documented in ED Course.    Risk  Prescription drug management.                ED Medication(s):  Medications   gabapentin capsule 100 mg (100 mg Oral Given 5/11/25 0132)   ketorolac injection 15 mg (15 mg Intravenous Given 5/11/25 0213)   iohexoL (OMNIPAQUE 350) injection 100 mL (100 mLs Intravenous Given 5/11/25 0231)       Current Discharge Medication List        START taking these medications    Details   gabapentin (NEURONTIN) 100 MG capsule Take 1-2 capsules (100-200 mg total) by mouth 3 (three) times daily.  Qty: 30 capsule, Refills: 0              Follow-up Information       Ward Vasquez MD. Schedule an appointment as soon as possible for a visit in 2 days.    Specialty: Family Medicine  Why: For re-evaluation and further treatment  Contact information:  [address]  [facility]  [address]  [phone]               [facility] - Emergency Dept.. Go today.    Specialty: Emergency Medicine  Why: If symptoms worsen, For re-evaluation and further treatment, As needed  Contact information:  [address]  [facility] [address] [phone]                               Scribe Attestation:   Scribe #1: I performed the above scribed service and the documentation accurately describes the services I performed. I attest to the accuracy of the note.     Attending:   Physician Attestation Statement for Scribe #1: I, Elier Iniguez MD, personally performed the services described in this documentation, as scribed by Mya Lepe, in my presence, and it is both accurate and complete.           Clinical Impression       ICD-10-CM ICD-9-CM   1. Groin pain  R10.30 789.09       Disposition:   Disposition: Discharged  Condition: Stable       Elier Iniguez MD  05/11/25 [id number]

## 2025-05-13 RX ORDER — GABAPENTIN 100 MG/1
100-200 CAPSULE ORAL 3 TIMES DAILY
Qty: 15 CAPSULE | Refills: 0 | Status: SHIPPED | OUTPATIENT
Start: 2025-05-13

## 2025-05-14 ENCOUNTER — LAB VISIT (OUTPATIENT)
Dept: LAB | Facility: HOSPITAL | Age: 60
End: 2025-05-14
Attending: INTERNAL MEDICINE
Payer: COMMERCIAL

## 2025-05-14 ENCOUNTER — OFFICE VISIT (OUTPATIENT)
Dept: UROLOGY | Facility: CLINIC | Age: 60
End: 2025-05-14
Payer: COMMERCIAL

## 2025-05-14 VITALS
DIASTOLIC BLOOD PRESSURE: 90 MMHG | SYSTOLIC BLOOD PRESSURE: 157 MMHG | HEIGHT: 72 IN | HEART RATE: 84 BPM | WEIGHT: 182.56 LBS | BODY MASS INDEX: 24.73 KG/M2

## 2025-05-14 DIAGNOSIS — Z12.5 PROSTATE CANCER SCREENING: ICD-10-CM

## 2025-05-14 DIAGNOSIS — I10 ESSENTIAL HYPERTENSION: ICD-10-CM

## 2025-05-14 DIAGNOSIS — M79.604 PAIN IN BOTH LOWER EXTREMITIES: ICD-10-CM

## 2025-05-14 DIAGNOSIS — G47.33 OSA (OBSTRUCTIVE SLEEP APNEA): ICD-10-CM

## 2025-05-14 DIAGNOSIS — M79.605 PAIN IN BOTH LOWER EXTREMITIES: ICD-10-CM

## 2025-05-14 DIAGNOSIS — N40.0 BENIGN PROSTATIC HYPERPLASIA WITHOUT LOWER URINARY TRACT SYMPTOMS: Primary | ICD-10-CM

## 2025-05-14 DIAGNOSIS — Z82.49 FAMILY HISTORY OF CARDIOVASCULAR DISEASE: ICD-10-CM

## 2025-05-14 LAB
ABSOLUTE EOSINOPHIL (OHS): 0.11 K/UL
ABSOLUTE MONOCYTE (OHS): 0.59 K/UL (ref 0.3–1)
ABSOLUTE NEUTROPHIL COUNT (OHS): 3.26 K/UL (ref 1.8–7.7)
ALBUMIN SERPL BCP-MCNC: 4.1 G/DL (ref 3.5–5.2)
ALP SERPL-CCNC: 51 UNIT/L (ref 40–150)
ALT SERPL W/O P-5'-P-CCNC: 20 UNIT/L (ref 10–44)
ANION GAP (OHS): 10 MMOL/L (ref 8–16)
AST SERPL-CCNC: 24 UNIT/L (ref 11–45)
BASOPHILS # BLD AUTO: 0.07 K/UL
BASOPHILS NFR BLD AUTO: 1.3 %
BILIRUB SERPL-MCNC: 0.5 MG/DL (ref 0.1–1)
BILIRUBIN, UA POC OHS: NEGATIVE
BLOOD, UA POC OHS: NEGATIVE
BUN SERPL-MCNC: 15 MG/DL (ref 6–20)
CALCIUM SERPL-MCNC: 9.5 MG/DL (ref 8.7–10.5)
CHLORIDE SERPL-SCNC: 106 MMOL/L (ref 95–110)
CHOLEST SERPL-MCNC: 176 MG/DL (ref 120–199)
CHOLEST/HDLC SERPL: 2.2 {RATIO} (ref 2–5)
CK SERPL-CCNC: 85 U/L (ref 20–200)
CLARITY, UA POC OHS: CLEAR
CO2 SERPL-SCNC: 25 MMOL/L (ref 23–29)
COLOR, UA POC OHS: YELLOW
CREAT SERPL-MCNC: 0.9 MG/DL (ref 0.5–1.4)
ERYTHROCYTE [DISTWIDTH] IN BLOOD BY AUTOMATED COUNT: 13.8 % (ref 11.5–14.5)
GFR SERPLBLD CREATININE-BSD FMLA CKD-EPI: >60 ML/MIN/1.73/M2
GLUCOSE SERPL-MCNC: 99 MG/DL (ref 70–110)
GLUCOSE, UA POC OHS: NEGATIVE
HCT VFR BLD AUTO: 46.9 % (ref 40–54)
HDLC SERPL-MCNC: 80 MG/DL (ref 40–75)
HDLC SERPL: 45.5 % (ref 20–50)
HGB BLD-MCNC: 15.5 GM/DL (ref 14–18)
IMM GRANULOCYTES # BLD AUTO: 0 K/UL (ref 0–0.04)
IMM GRANULOCYTES NFR BLD AUTO: 0 % (ref 0–0.5)
KETONES, UA POC OHS: NEGATIVE
LDLC SERPL CALC-MCNC: 86.4 MG/DL (ref 63–159)
LEUKOCYTES, UA POC OHS: NEGATIVE
LYMPHOCYTES # BLD AUTO: 1.46 K/UL (ref 1–4.8)
MCH RBC QN AUTO: 31.8 PG (ref 27–31)
MCHC RBC AUTO-ENTMCNC: 33 G/DL (ref 32–36)
MCV RBC AUTO: 96 FL (ref 82–98)
NITRITE, UA POC OHS: NEGATIVE
NONHDLC SERPL-MCNC: 96 MG/DL
NUCLEATED RBC (/100WBC) (OHS): 0 /100 WBC
PH, UA POC OHS: 7
PLATELET # BLD AUTO: 279 K/UL (ref 150–450)
PMV BLD AUTO: 10.8 FL (ref 9.2–12.9)
POTASSIUM SERPL-SCNC: 4.5 MMOL/L (ref 3.5–5.1)
PROT SERPL-MCNC: 7.3 GM/DL (ref 6–8.4)
PROTEIN, UA POC OHS: NEGATIVE
PSA SERPL-MCNC: 1.65 NG/ML
RBC # BLD AUTO: 4.88 M/UL (ref 4.6–6.2)
RELATIVE EOSINOPHIL (OHS): 2 %
RELATIVE LYMPHOCYTE (OHS): 26.6 % (ref 18–48)
RELATIVE MONOCYTE (OHS): 10.7 % (ref 4–15)
RELATIVE NEUTROPHIL (OHS): 59.4 % (ref 38–73)
SODIUM SERPL-SCNC: 141 MMOL/L (ref 136–145)
SPECIFIC GRAVITY, UA POC OHS: 1.02
TRIGL SERPL-MCNC: 48 MG/DL (ref 30–150)
TSH SERPL-ACNC: 1.18 UIU/ML (ref 0.4–4)
UROBILINOGEN, UA POC OHS: 0.2
WBC # BLD AUTO: 5.49 K/UL (ref 3.9–12.7)

## 2025-05-14 PROCEDURE — 81003 URINALYSIS AUTO W/O SCOPE: CPT | Mod: QW,S$GLB,, | Performed by: NURSE PRACTITIONER

## 2025-05-14 PROCEDURE — 80061 LIPID PANEL: CPT

## 2025-05-14 PROCEDURE — 85025 COMPLETE CBC W/AUTO DIFF WBC: CPT

## 2025-05-14 PROCEDURE — 84153 ASSAY OF PSA TOTAL: CPT

## 2025-05-14 PROCEDURE — 99999 PR PBB SHADOW E&M-EST. PATIENT-LVL III: CPT | Mod: PBBFAC,,, | Performed by: NURSE PRACTITIONER

## 2025-05-14 PROCEDURE — 84443 ASSAY THYROID STIM HORMONE: CPT

## 2025-05-14 PROCEDURE — 99204 OFFICE O/P NEW MOD 45 MIN: CPT | Mod: S$GLB,,, | Performed by: NURSE PRACTITIONER

## 2025-05-14 PROCEDURE — 80053 COMPREHEN METABOLIC PANEL: CPT

## 2025-05-14 PROCEDURE — 82550 ASSAY OF CK (CPK): CPT

## 2025-05-14 PROCEDURE — 36415 COLL VENOUS BLD VENIPUNCTURE: CPT

## 2025-05-14 NOTE — PROGRESS NOTES
Chief Complaint:   Prostate cancer screening  BPH    HPI:   Patient is a 59-year-old male that presented to the emergency room with an acute onset of bilateral leg pain.  Was informed by ED physician that he has a large prostate seen on imaging.  Patient denies gross hematuria.  States that nocturia is once nightly and daytime stream is fairly strong, no BPH meds.  No  cancers in his family.  Urine in clinic is negative and PVR is 22 mL.  No PSA results in [facility] system to review.  ED note  05/11/2025  History of Present Illness: Artie Davila is a 59 y.o. male patient with a PMHx of HTN who presents to the Emergency Department for evaluation of groin pain that radiates down the front of his legs to the knees, bilaterally, which onset approximately 2 months ago. Pt was seen by Dr. Coles (cardiologist) on 05/02 and was referred to rheumatology. Rheumatology appointment is in July. Pt describes the pain as a sharp and burning sensation. Pt constantly moves for work and is unable to tolerate sxs any longer. Symptoms are constant and moderate in severity. No mitigating or exacerbating factors reported. Bending knees and hips do not worsen sxs. No additional associated sxs provided. Patient denies any fever, nausea, dysuria, difficulty urinating, hematuria, and all other sxs at this time. Prior Tx includes tylenol and ibuprofen with minimal relief. No further complaints or concerns at this time.         Allergies:  Ace inhibitors    Medications:  has a current medication list which includes the following prescription(s): amlodipine, diclofenac, gabapentin, hydrochlorothiazide, and losartan.    Review of Systems:  General: No fever, chills, fatigability, or weight loss.  Skin: No rashes, itching, or changes in color or texture of skin.  Chest: Denies MCLEAN, cyanosis, wheezing, cough, and sputum production.  Abdomen: Appetite fine. No weight loss. Denies diarrhea, abdominal pain, hematemesis, or blood in  stool.  Musculoskeletal: No joint stiffness or swelling. Denies back pain.  : As above.  All other review of systems negative.    PMH:   has a past medical history of Hypertension.KAL    PSH:    Back suregery, Right knee sx to remove a cyst    FamHx: none    SocHx:  reports that he has never smoked. His smokeless tobacco use includes snuff. He reports that he does not use drugs. No history on file for alcohol use.      Physical Exam:  General: A&Ox3, no apparent distress, no deformities  Neck: No masses, normal thyroid  Lungs: normal inspiration, no use of accessory muscles  Heart: normal pulse, no arrhythmias  Abdomen: Soft, NT, ND, no masses, no hernias, no hepatosplenomegaly  Lymphatic: Neck and groin nodes negative  Skin: The skin is warm and dry. No jaundice.  Ext: No c/c/e.  : Test desc bea, no abnormalities of epididymus. Penis with normal penile and scrotal skin. Meatus normal. Normal rectal tone, no hemorrhoids. Prost 35 gm no nodules or masses appreciated. SV not palpable. Perineum and anus normal.    Labs/Studies:   05/11/2025  EXAMINATION:  CT PELVIS WITH IV CONTRAST     CLINICAL HISTORY:  Abdominal pain, acute, nonlocalized;     TECHNIQUE:  Low-dose axial noncontrast CT images of the pelvis were obtained.  Multiplanar reformats which     COMPARISON:  None     FINDINGS:  Visualized abdominopelvic organs appear unremarkable.  Diverticulosis without diverticulitis.  No hydroureter.     Bladder within normal limits.     Appendix appears unremarkable.  Visualized vascular structures are patent.  No retroperitoneal adenopathy.  Osseous structures show age expected degenerative changes.     No inguinal mass or adenopathy.  No hernia is identified.     Impression:     No definite acute abnormality identified.  Correlation and further evaluation as warranted.    Impression/Plan:   Prostate cancer screening  ABDIEL was unremarkable, patient was sent for PSA lab.  If normal, return to clinic in 12 months.  BPH  symptoms are very mild, patient does not want to consider medication and I agree.

## 2025-05-15 ENCOUNTER — RESULTS FOLLOW-UP (OUTPATIENT)
Dept: CARDIOLOGY | Facility: CLINIC | Age: 60
End: 2025-05-15

## 2025-05-15 ENCOUNTER — RESULTS FOLLOW-UP (OUTPATIENT)
Dept: UROLOGY | Facility: CLINIC | Age: 60
End: 2025-05-15

## 2025-06-25 ENCOUNTER — HOSPITAL ENCOUNTER (OUTPATIENT)
Dept: RADIOLOGY | Facility: HOSPITAL | Age: 60
Discharge: HOME | End: 2025-06-25
Attending: INTERNAL MEDICINE
Payer: COMMERCIAL

## 2025-06-25 ENCOUNTER — HOSPITAL ENCOUNTER (OUTPATIENT)
Dept: CARDIOLOGY | Facility: HOSPITAL | Age: 60
Discharge: HOME | End: 2025-06-25
Attending: INTERNAL MEDICINE
Payer: COMMERCIAL

## 2025-06-25 VITALS
DIASTOLIC BLOOD PRESSURE: 72 MMHG | DIASTOLIC BLOOD PRESSURE: 78 MMHG | BODY MASS INDEX: 24.65 KG/M2 | BODY MASS INDEX: 24.65 KG/M2 | WEIGHT: 182 LBS | WEIGHT: 182 LBS | HEIGHT: 72 IN | BODY MASS INDEX: 24.65 KG/M2 | HEIGHT: 72 IN | SYSTOLIC BLOOD PRESSURE: 161 MMHG | HEIGHT: 72 IN | SYSTOLIC BLOOD PRESSURE: 161 MMHG | WEIGHT: 182 LBS

## 2025-06-25 VITALS
DIASTOLIC BLOOD PRESSURE: 90 MMHG | HEIGHT: 72 IN | SYSTOLIC BLOOD PRESSURE: 158 MMHG | BODY MASS INDEX: 24.65 KG/M2 | WEIGHT: 182 LBS

## 2025-06-25 DIAGNOSIS — M79.604 PAIN IN BOTH LOWER EXTREMITIES: ICD-10-CM

## 2025-06-25 DIAGNOSIS — Z82.49 FAMILY HISTORY OF CARDIOVASCULAR DISEASE: ICD-10-CM

## 2025-06-25 DIAGNOSIS — M79.605 PAIN IN BOTH LOWER EXTREMITIES: ICD-10-CM

## 2025-06-25 DIAGNOSIS — G47.33 OSA (OBSTRUCTIVE SLEEP APNEA): ICD-10-CM

## 2025-06-25 DIAGNOSIS — I10 ESSENTIAL HYPERTENSION: ICD-10-CM

## 2025-06-25 DIAGNOSIS — Z13.6 ENCOUNTER FOR SCREENING FOR CARDIOVASCULAR DISORDERS: ICD-10-CM

## 2025-06-25 LAB
AORTIC ROOT ANNULUS: 3.8 CM
AORTIC SIZE INDEX (SOV): 1.5 CM/M2
AV INDEX (PROSTH): 0.8
AV MEAN GRADIENT: 3 MMHG
AV PEAK GRADIENT: 5 MMHG
AV VALVE AREA BY VELOCITY RATIO: 2.6 CM²
AV VALVE AREA: 2.5 CM²
AV VELOCITY RATIO: 0.82
BSA FOR ECHO PROCEDURE: 2.05 M2
CV ECHO LV RWT: 0.47 CM
DOP CALC AO PEAK VEL: 1.1 M/S
DOP CALC AO VTI: 26.8 CM
DOP CALC LVOT AREA: 3.1 CM2
DOP CALC LVOT DIAMETER: 2 CM
DOP CALC LVOT PEAK VEL: 0.9 M/S
DOP CALC LVOT STROKE VOLUME: 67.2 CM3
DOP CALC RVOT PEAK VEL: 0.75 M/S
DOP CALC RVOT VTI: 14.1 CM
DOP CALCLVOT PEAK VEL VTI: 21.4 CM
E WAVE DECELERATION TIME: 286 MSEC
E/A RATIO: 1.33
E/E' RATIO: 7 M/S
ECHO LV POSTERIOR WALL: 1.1 CM (ref 0.6–1.1)
FRACTIONAL SHORTENING: 29.8 % (ref 28–44)
INTERVENTRICULAR SEPTUM: 1 CM (ref 0.6–1.1)
IVRT: 69 MSEC
LA MAJOR: 5.3 CM
LA MINOR: 5.1 CM
LA WIDTH: 3.8 CM
LEFT ATRIUM AREA SYSTOLIC (APICAL 2 CHAMBER): 23 CM2
LEFT ATRIUM AREA SYSTOLIC (APICAL 4 CHAMBER): 21.41 CM2
LEFT ATRIUM SIZE: 4 CM
LEFT ATRIUM VOLUME INDEX MOD: 34 ML/M2
LEFT ATRIUM VOLUME INDEX: 33 ML/M2
LEFT ATRIUM VOLUME MOD: 70 ML
LEFT ATRIUM VOLUME: 67 CM3
LEFT INTERNAL DIMENSION IN SYSTOLE: 3.3 CM (ref 2.1–4)
LEFT VENTRICLE DIASTOLIC VOLUME INDEX: 49.76 ML/M2
LEFT VENTRICLE DIASTOLIC VOLUME: 102 ML
LEFT VENTRICLE END SYSTOLIC VOLUME APICAL 2 CHAMBER: 77.04 ML
LEFT VENTRICLE END SYSTOLIC VOLUME APICAL 4 CHAMBER: 61.36 ML
LEFT VENTRICLE MASS INDEX: 85.8 G/M2
LEFT VENTRICLE SYSTOLIC VOLUME INDEX: 21 ML/M2
LEFT VENTRICLE SYSTOLIC VOLUME: 43 ML
LEFT VENTRICULAR INTERNAL DIMENSION IN DIASTOLE: 4.7 CM (ref 3.5–6)
LEFT VENTRICULAR MASS: 175.8 G
LV LATERAL E/E' RATIO: 5.5 M/S
LV SEPTAL E/E' RATIO: 10.2 M/S
LVED V (TEICH): 101.64 ML
LVES V (TEICH): 43.45 ML
LVOT MG: 1.7 MMHG
LVOT MV: 0.62 CM/S
MV PEAK A VEL: 0.46 M/S
MV PEAK E VEL: 0.61 M/S
OHS CV RV/LV RATIO: 0.47 CM
PISA TR MAX VEL: 2.6 M/S
PV MEAN GRADIENT: 1 MMHG
PV MV: 0.58 M/S
PV PEAK GRADIENT: 3 MMHG
PV PEAK VELOCITY: 0.93 M/S
RA MAJOR: 4.95 CM
RA PRESSURE ESTIMATED: 3 MMHG
RA WIDTH: 3.13 CM
RIGHT VENTRICLE DIASTOLIC BASEL DIMENSION: 2.2 CM
RIGHT VENTRICULAR END-DIASTOLIC DIMENSION: 2.23 CM
RV TB RVSP: 6 MMHG
SINUS: 3.1 CM
STJ: 3.2 CM
TDI LATERAL: 0.11 M/S
TDI SEPTAL: 0.06 M/S
TDI: 0.09 M/S
TR MAX PG: 27 MMHG
TRICUSPID ANNULAR PLANE SYSTOLIC EXCURSION: 1.9 CM
TV REST PULMONARY ARTERY PRESSURE: 30 MMHG
Z-SCORE OF LEFT VENTRICULAR DIMENSION IN END DIASTOLE: -2.69
Z-SCORE OF LEFT VENTRICULAR DIMENSION IN END SYSTOLE: -1.05

## 2025-06-25 PROCEDURE — 93922 UPR/L XTREMITY ART 2 LEVELS: CPT

## 2025-06-25 PROCEDURE — 93306 TTE W/DOPPLER COMPLETE: CPT | Mod: 26,,, | Performed by: INTERNAL MEDICINE

## 2025-06-25 PROCEDURE — 93970 EXTREMITY STUDY: CPT | Mod: 26,,, | Performed by: INTERNAL MEDICINE

## 2025-06-25 PROCEDURE — 93922 UPR/L XTREMITY ART 2 LEVELS: CPT | Mod: 26,,, | Performed by: INTERNAL MEDICINE

## 2025-06-25 PROCEDURE — 93925 LOWER EXTREMITY STUDY: CPT | Mod: 26,,, | Performed by: INTERNAL MEDICINE

## 2025-06-25 PROCEDURE — 75571 CT HRT W/O DYE W/CA TEST: CPT | Mod: TC

## 2025-06-25 PROCEDURE — 93925 LOWER EXTREMITY STUDY: CPT

## 2025-06-25 PROCEDURE — 93306 TTE W/DOPPLER COMPLETE: CPT

## 2025-06-25 PROCEDURE — 75571 CT HRT W/O DYE W/CA TEST: CPT | Mod: 26,,, | Performed by: STUDENT IN AN ORGANIZED HEALTH CARE EDUCATION/TRAINING PROGRAM

## 2025-06-25 PROCEDURE — 93970 EXTREMITY STUDY: CPT

## 2025-06-26 LAB
LEFT ABI: 1.06
LEFT ANT TIBIAL SYS PSV: 45 CM/S
LEFT ARM BP: 161 MMHG
LEFT CFA PSV: 64 CM/S
LEFT DORSALIS PEDIS: 167 MMHG
LEFT PERONEAL SYS PSV: 50 CM/S
LEFT POPLITEAL PSV: 59 CM/S
LEFT POST TIBIAL SYS PSV: 37 CM/S
LEFT POSTERIOR TIBIAL: 171 MMHG
LEFT PROFUNDA SYS PSV: 69 CM/S
LEFT SUPER FEMORAL DIST SYS PSV: 66 CM/S
LEFT SUPER FEMORAL MID SYS PSV: 74 CM/S
LEFT SUPER FEMORAL OSTIAL SYS PSV: 74 CM/S
LEFT SUPER FEMORAL PROX SYS PSV: 76 CM/S
LEFT TBI: 0.79
LEFT TIB/PER TRUNK SYS PSV: 52 CM/S
LEFT TOE PRESSURE: 127 MMHG
OHS CV LEFT LOWER EXTREMITY ABI (NO CALC): 1.06
OHS CV RIGHT ABI LOWER EXTREMITY (NO CALC): 1.12
RIGHT ABI: 1.12
RIGHT ANT TIBIAL SYS PSV: 58 CM/S
RIGHT ARM BP: 152 MMHG
RIGHT CFA PSV: 65 CM/S
RIGHT DORSALIS PEDIS: 173 MMHG
RIGHT PERONEAL SYS PSV: 55 CM/S
RIGHT POPLITEAL PSV: 63 CM/S
RIGHT POST TIBIAL SYS PSV: 43 CM/S
RIGHT POSTERIOR TIBIAL: 180 MMHG
RIGHT PROFUNDA SYS PSV: 42 CM/S
RIGHT SUPER FEMORAL DIST SYS PSV: 77 CM/S
RIGHT SUPER FEMORAL MID SYS PSV: 79 CM/S
RIGHT SUPER FEMORAL OSTIAL SYS PSV: 83 CM/S
RIGHT SUPER FEMORAL PROX SYS PSV: 83 CM/S
RIGHT TBI: 0.73
RIGHT TIB/PER TRUNK SYS PSV: 42 CM/S
RIGHT TOE PRESSURE: 118 MMHG

## 2025-07-11 ENCOUNTER — TELEPHONE (OUTPATIENT)
Dept: CARDIOLOGY | Facility: CLINIC | Age: 60
End: 2025-07-11
Payer: COMMERCIAL

## 2025-07-11 NOTE — TELEPHONE ENCOUNTER
Called and advised results and he voiced understanding. ----- Message from Ernesto Coles MD sent at 7/11/2025  9:04 AM CDT -----  Please contact the patient and let them know that their results were fine and do not require any change in treatment.  ----- Message -----  From: [facility], System Message  Sent: 7/10/2025  11:03 PM CDT  To: Ernesto Coles MD  Subject: Notification of Unviewed Test Results            MARQUIS DAVIS has not viewed the following results:  - CV US DOPPLER VENOUS LEGS BILATERAL (CUPID ONLY)  
Universal Safety Interventions

## 2025-07-11 NOTE — TELEPHONE ENCOUNTER
Contacted patient to let them the results of their echo. Patient verbalized clear understanding.     ----- Message from Ernesto Coles MD sent at 7/11/2025  9:05 AM CDT -----  Please contact the patient and let them know that their results were fine and do not require any change in treatment.  ----- Message -----  From: [facility], System Message  Sent: 7/10/2025  11:03 PM CDT  To: Ernesto Coles MD  Subject: Notification of Unviewed Test Results            MARQUIS DAVIS has not viewed the following results:  - ECHO (CUPID ONLY)

## 2025-08-07 ENCOUNTER — TELEPHONE (OUTPATIENT)
Dept: CARDIOLOGY | Facility: CLINIC | Age: 60
End: 2025-08-07
Payer: COMMERCIAL

## 2025-08-07 NOTE — TELEPHONE ENCOUNTER
Cardiac Clearance was sent successfully       Jomar Rodriguez MD  Orthopedic Surgery  NPI: [id number]  [address] [facility] [address]     Phone: [phone]  Fax: [phone]

## 2025-08-07 NOTE — TELEPHONE ENCOUNTER
Please fax clearance.  Pt may proceed with surgery at low CV risk.    Dr Sanket Rodriguez Orthopedic Surgeon from [facility] is requesting cardiac clearance for upcoming Hip Total Joint Implantation Anterior. Procedure is scheduled for 8/19/25 and patient was last seen in clinic on 5/15/25.      Any recommendations or contraindications prior to proceeding, if applicable ? Please advise thank you.

## 2025-08-07 NOTE — TELEPHONE ENCOUNTER
Dr.Kevin Rodriguez Orthopedic Surgeon from [facility] is requesting cardiac clearance for upcoming Hip Total Joint Implantation Anterior. Procedure is scheduled for 8/19/25 and patient was last seen in clinic on 5/15/25.     Any recommendations or contraindications prior to proceeding, if applicable ? Please advise thank you.